# Patient Record
Sex: MALE | Race: WHITE | NOT HISPANIC OR LATINO
[De-identification: names, ages, dates, MRNs, and addresses within clinical notes are randomized per-mention and may not be internally consistent; named-entity substitution may affect disease eponyms.]

---

## 2021-05-19 PROBLEM — Z00.00 ENCOUNTER FOR PREVENTIVE HEALTH EXAMINATION: Status: ACTIVE | Noted: 2021-05-19

## 2021-05-26 ENCOUNTER — APPOINTMENT (OUTPATIENT)
Dept: NEUROLOGY | Facility: CLINIC | Age: 81
End: 2021-05-26
Payer: MEDICARE

## 2021-05-26 VITALS
DIASTOLIC BLOOD PRESSURE: 72 MMHG | HEART RATE: 62 BPM | BODY MASS INDEX: 28 KG/M2 | TEMPERATURE: 97.9 F | SYSTOLIC BLOOD PRESSURE: 143 MMHG | HEIGHT: 71 IN | OXYGEN SATURATION: 94 % | WEIGHT: 200 LBS

## 2021-05-26 DIAGNOSIS — Z86.39 PERSONAL HISTORY OF OTHER ENDOCRINE, NUTRITIONAL AND METABOLIC DISEASE: ICD-10-CM

## 2021-05-26 DIAGNOSIS — Z87.438 PERSONAL HISTORY OF OTHER DISEASES OF MALE GENITAL ORGANS: ICD-10-CM

## 2021-05-26 PROCEDURE — 99072 ADDL SUPL MATRL&STAF TM PHE: CPT

## 2021-05-26 PROCEDURE — 99204 OFFICE O/P NEW MOD 45 MIN: CPT
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
## 2021-05-26 NOTE — PHYSICAL EXAM
[FreeTextEntry1] : Alert. Fully oriented to conversation. Speech/language intact. CN intact. Motor exam intact in UE bilateral though he does have some ROM limitation due to prior shoulder injuries. RLE strength intact. LLE 4/5 with foot dorsiflexion, 4/5 foot inversion and eversion, 4/5 toe extension. Reflexes absent at bilateral ankles, brisk but symmetric at knees, and normal in UE's bilaterally. Toe down on left, equivocal on right. Sensory exam shows relatively symmetric patchy loss of temperature/pin of his bilateral LE's below the knees as well as absent vibration at toes/ankles bilaterally, normalizing at knees. FTN,  HTN intact. Gait is slightly wide based and he does have a mild steppage gait (L).

## 2021-05-26 NOTE — END OF VISIT
Appropriately full code per her wishes   [Time Spent: ___ minutes] : I have spent [unfilled] minutes of time on the encounter.

## 2021-05-26 NOTE — ASSESSMENT
[FreeTextEntry1] : The patient is a 80 year old male with peripheral neuropathy and a left foot drop.  Exam would suggest foot drop is secondary to L5 radiculopathy more than peroneal neuropathy.  Patient is interested in repeat EMG to confirm diagnosis and possibly MRI L spine pending results (nonurgent as patient has had stable symptoms for years).  He also PN on exam and we will order a few labs to evaluate for treatable causes.  The patient knows his symptoms are chronic and unlikely to reverse but is still hopeful.  He is not interested in PT and is not interested in trying gabapentin for his pain.  He will return to review results with Dr. Eubanks in the future

## 2021-06-01 LAB
ALBUMIN MFR SERPL ELPH: 61.6 %
ALBUMIN SERPL ELPH-MCNC: 4.3 G/DL
ALBUMIN SERPL-MCNC: 3.9 G/DL
ALBUMIN/GLOB SERPL: 1.6 RATIO
ALP BLD-CCNC: 83 U/L
ALPHA1 GLOB MFR SERPL ELPH: 4.1 %
ALPHA1 GLOB SERPL ELPH-MCNC: 0.3 G/DL
ALPHA2 GLOB MFR SERPL ELPH: 10.9 %
ALPHA2 GLOB SERPL ELPH-MCNC: 0.7 G/DL
ALT SERPL-CCNC: 16 U/L
ANION GAP SERPL CALC-SCNC: 10 MMOL/L
AST SERPL-CCNC: 20 U/L
B-GLOBULIN MFR SERPL ELPH: 10.5 %
B-GLOBULIN SERPL ELPH-MCNC: 0.7 G/DL
BASOPHILS # BLD AUTO: 0.04 K/UL
BASOPHILS NFR BLD AUTO: 0.7 %
BILIRUB SERPL-MCNC: 0.5 MG/DL
BUN SERPL-MCNC: 23 MG/DL
CALCIUM SERPL-MCNC: 9.7 MG/DL
CHLORIDE SERPL-SCNC: 104 MMOL/L
CO2 SERPL-SCNC: 26 MMOL/L
CREAT SERPL-MCNC: 0.71 MG/DL
CRP SERPL-MCNC: <3 MG/L
EOSINOPHIL # BLD AUTO: 0.34 K/UL
EOSINOPHIL NFR BLD AUTO: 5.5 %
ERYTHROCYTE [SEDIMENTATION RATE] IN BLOOD BY WESTERGREN METHOD: 2 MM/HR
ESTIMATED AVERAGE GLUCOSE: 120 MG/DL
FOLATE SERPL-MCNC: 11 NG/ML
GAMMA GLOB FLD ELPH-MCNC: 0.8 G/DL
GAMMA GLOB MFR SERPL ELPH: 12.9 %
GLUCOSE SERPL-MCNC: 90 MG/DL
HBA1C MFR BLD HPLC: 5.8 %
HCT VFR BLD CALC: 44.4 %
HGB BLD-MCNC: 13.8 G/DL
IMM GRANULOCYTES NFR BLD AUTO: 0.3 %
INTERPRETATION SERPL IEP-IMP: NORMAL
LYMPHOCYTES # BLD AUTO: 1.13 K/UL
LYMPHOCYTES NFR BLD AUTO: 18.4 %
MAN DIFF?: NORMAL
MCHC RBC-ENTMCNC: 31.1 GM/DL
MCHC RBC-ENTMCNC: 31.5 PG
MCV RBC AUTO: 101.4 FL
MONOCYTES # BLD AUTO: 0.74 K/UL
MONOCYTES NFR BLD AUTO: 12.1 %
NEUTROPHILS # BLD AUTO: 3.87 K/UL
NEUTROPHILS NFR BLD AUTO: 63 %
PLATELET # BLD AUTO: 224 K/UL
POTASSIUM SERPL-SCNC: 4.8 MMOL/L
PROT SERPL-MCNC: 6.4 G/DL
RBC # BLD: 4.38 M/UL
RBC # FLD: 13.3 %
SODIUM SERPL-SCNC: 140 MMOL/L
TESTOST SERPL-MCNC: 440 NG/DL
TSH SERPL-ACNC: 1.1 UIU/ML
VIT B12 SERPL-MCNC: 470 PG/ML
WBC # FLD AUTO: 6.14 K/UL

## 2021-08-03 ENCOUNTER — FORM ENCOUNTER (OUTPATIENT)
Age: 81
End: 2021-08-03

## 2021-08-03 ENCOUNTER — APPOINTMENT (OUTPATIENT)
Dept: NEUROLOGY | Facility: CLINIC | Age: 81
End: 2021-08-03
Payer: MEDICARE

## 2021-08-03 VITALS
SYSTOLIC BLOOD PRESSURE: 127 MMHG | DIASTOLIC BLOOD PRESSURE: 72 MMHG | OXYGEN SATURATION: 96 % | WEIGHT: 210 LBS | BODY MASS INDEX: 29.4 KG/M2 | HEART RATE: 70 BPM | TEMPERATURE: 98 F | HEIGHT: 71 IN

## 2021-08-03 DIAGNOSIS — M54.16 RADICULOPATHY, LUMBAR REGION: ICD-10-CM

## 2021-08-03 PROCEDURE — 95912 NRV CNDJ TEST 11-12 STUDIES: CPT

## 2021-08-03 PROCEDURE — 99214 OFFICE O/P EST MOD 30 MIN: CPT | Mod: 25

## 2021-08-03 PROCEDURE — 95886 MUSC TEST DONE W/N TEST COMP: CPT

## 2021-08-03 NOTE — PHYSICAL EXAM
[FreeTextEntry1] : Motor: ankle dorsiflexion 4+/5 L, 5/5 R; EHL 4- L. 4+ R; inversion and eversion 5/5 b/l; EDB 4 b/l; otherwise 5/5 throughout\par Sensory: vibration absent at toes and ankles; LT/PP symmetric in LE b/l\par Reflexes: 1+ UE and patellar, absent achilles b/l\par Gait: narrow base, no foot drop, Romberg negative

## 2021-08-03 NOTE — PROCEDURE
[FreeTextEntry1] : \par Nerve Conduction and Electromyography Report [FreeTextEntry3] : \par Electro Physiologic Findings:\par \par Limb temperature was monitored and maintained at approximately 30 – 34° C in the lower extremities, and 32 – 36° C in the upper extremities.\par \par The left sural and bilateral superficial fibular sensory responses were absent. The left radial sensory response was low amplitude and mildly slow. \par \par The tibial and fibular (at the EDB) motor responses were absent bilaterally. The fibular motor responses at the tibialis anterior were normal bilaterally and symmetric; there was no slowing or conduction block across the fibular head. The left median motor response was normal. \par \par Needle electromyography was performed on select left lower extremity L2-S2 appendicular muscles. There was moderate chronic denervation in the left tibialis anterior, and mild chronic denervation in the left peroneus longus, vastus lateralis, and tensor fascia latae. The left gastrocnemius was normal, and there was no active denervation in any of the muscles tested. \par \par Clinical Electrophysiological Impression: \par  \par This electrodiagnostic study demonstrated an axonal sensorimotor polyneuropathy, severely affecting the feet, and mildly affecting the hands. \par \par There was also evidence of chronic left L4 and L5 radiculopathies, without active denervation. This may be residual from prior to the patient’s lumbar spine decompression in 2015. \par \par There was no evidence of a left common fibular (peroneal) nerve entrapment at the knee.

## 2021-08-03 NOTE — ASSESSMENT
[FreeTextEntry1] : He has evidence of polyneuropathy in his feet b/l on exam and on NCS/EMG - likely idiopathic given unremarkable workup\par \par He also has chronic left L4 and L5 radiculopathies on EMG - no active denervation, indicating there is no ongoing compression, but rather seeing the result of prior injury (perhaps prior to his lumbar spine decompression in 2015)\par \par Repeat MRI L spine ordered to r/o ongoing compression of these nerves\par Continue balance exercises \par \par See separate procedure note for full results of NCS/EMG study

## 2021-08-03 NOTE — HISTORY OF PRESENT ILLNESS
[FreeTextEntry1] : Referred by Dr. Prieto for left foot weakness\par Symptoms started about 2 years ago, along with imbalance \par Foot used to "flop" when he walked, but that improved\par He also has some pain in the left leg, burning sensation, worse at night - but not always\par \par He had a lumbar spine surgery in 2015, and bilateral shoulder dislocations with axillary nerve injuries in 2012\par \par Reviewed:\par Labs - B12, folate, TSH, ESR, SPEP normal\par A1C 5.8%

## 2021-08-11 ENCOUNTER — APPOINTMENT (OUTPATIENT)
Dept: NEUROLOGY | Facility: CLINIC | Age: 81
End: 2021-08-11
Payer: MEDICARE

## 2021-08-11 VITALS
HEART RATE: 75 BPM | SYSTOLIC BLOOD PRESSURE: 135 MMHG | OXYGEN SATURATION: 96 % | BODY MASS INDEX: 29.96 KG/M2 | DIASTOLIC BLOOD PRESSURE: 80 MMHG | WEIGHT: 214 LBS | HEIGHT: 71 IN | TEMPERATURE: 98.4 F

## 2021-08-11 DIAGNOSIS — G62.9 POLYNEUROPATHY, UNSPECIFIED: ICD-10-CM

## 2021-08-11 PROCEDURE — 99215 OFFICE O/P EST HI 40 MIN: CPT

## 2021-08-11 RX ORDER — ATORVASTATIN CALCIUM 40 MG/1
40 TABLET, FILM COATED ORAL DAILY
Refills: 0 | Status: ACTIVE | COMMUNITY

## 2021-08-11 RX ORDER — TAMSULOSIN HYDROCHLORIDE 0.4 MG/1
0.4 CAPSULE ORAL
Qty: 90 | Refills: 1 | Status: ACTIVE | COMMUNITY

## 2021-09-10 ENCOUNTER — NON-APPOINTMENT (OUTPATIENT)
Age: 81
End: 2021-09-10

## 2021-09-10 DIAGNOSIS — G96.198 OTHER DISORDERS OF MENINGES, NOT ELSEWHERE CLASSIFIED: ICD-10-CM

## 2021-09-14 ENCOUNTER — NON-APPOINTMENT (OUTPATIENT)
Age: 81
End: 2021-09-14

## 2021-09-17 ENCOUNTER — NON-APPOINTMENT (OUTPATIENT)
Age: 81
End: 2021-09-17

## 2021-09-19 NOTE — ASSESSMENT
[FreeTextEntry1] : B12 is at low normal - will supplement with sublingual\par \par Lumbar radiculopathy s/p fusion - get MRI to evaluate for new or recurrent disc. CT shows ?fluid collection - will get MRI and refer to spine surgeon for second opinion.
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
## 2021-09-19 NOTE — PHYSICAL EXAM
[FreeTextEntry1] : Mental status: The patient is alert and oriented x3, naming intact x3, repetition normal, follows three-step commands, and is able to participate fully in the history taking. \par Speech is clear and fluent with good repetition, comprehension, and naming. No evidence of dysarthria.\par Memory is intact: Immediate recall 3 out of 3, short-term 3 out of 3, remote memory intact. \par \par Cranial nerves II through XII intact: \par CN II: Visual fields are full to confrontation. Pupils are 3 mm and briskly reactive to light. Visual acuity is 20/20 bilaterally.\par CN III, IV, VI: At primary gaze, there is no eye deviation. EOMI. No ptosis\par CN V: Facial sensation is intact to pinprick in all 3 divisions bilaterally. \par CN VII: Face is symmetric with normal eye closure and smile.\par CN VII: Hearing is normal to rubbing fingers\par CN IX, X: Palate elevates symmetrically. Phonation is normal.\par CN XI: Head turning and shoulder shrug are intact\par CN XII: Tongue is midline with normal movements and no atrophy.\par \par Motor exam: Upper and lower extremities 5 out of 5 power, normal muscle tone and bulk. No abnormal movements noted.\par Sensory exam: Light touch, position sense, and pinprick are intact in fingers and toes. Vibratory sense present but more diminished distally, symmetrical b/l. Sway when stands with eyes closed. \par Coordination and vestibular exam: Finger to nose intact, no evidence of truncal or appendicular ataxia. No evidence of nystagmus. ***Vestibular symptoms elicited with head turning during ambulation. Veers to left with eyes closed. \par Gait: Normal stance and gait. Posture is normal. Gait is slightly wide-based, steppage gait, unsteady with normal steps, base, arm swing, and turning. Unsteady tandem gait. Heel walking- difficulty lifting L foot, toe walking normal. Tandem gait is normal. \par Reflexes: One to 2+ in upper and lower extremities. No pathological reflexes. Downgoing toes.\par

## 2021-11-10 ENCOUNTER — APPOINTMENT (OUTPATIENT)
Dept: NEUROLOGY | Facility: CLINIC | Age: 81
End: 2021-11-10
Payer: MEDICARE

## 2021-11-10 VITALS
RESPIRATION RATE: 16 BRPM | HEIGHT: 71 IN | WEIGHT: 216 LBS | SYSTOLIC BLOOD PRESSURE: 116 MMHG | DIASTOLIC BLOOD PRESSURE: 66 MMHG | BODY MASS INDEX: 30.24 KG/M2 | HEART RATE: 72 BPM | TEMPERATURE: 97.3 F | OXYGEN SATURATION: 94 %

## 2021-11-10 DIAGNOSIS — E53.8 DEFICIENCY OF OTHER SPECIFIED B GROUP VITAMINS: ICD-10-CM

## 2021-11-10 DIAGNOSIS — Z87.39 PERSONAL HISTORY OF OTHER DISEASES OF THE MUSCULOSKELETAL SYSTEM AND CONNECTIVE TISSUE: ICD-10-CM

## 2021-11-10 PROCEDURE — 99214 OFFICE O/P EST MOD 30 MIN: CPT

## 2021-11-11 PROBLEM — E53.8 LOW VITAMIN B12 LEVEL: Status: ACTIVE | Noted: 2021-08-11

## 2021-11-11 PROBLEM — Z87.39 HISTORY OF LEFT FOOT DROP: Status: ACTIVE | Noted: 2021-05-26

## 2021-11-14 NOTE — ASSESSMENT
[FreeTextEntry1] : Agreeable to see Dr. 

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
## 2022-04-13 ENCOUNTER — APPOINTMENT (OUTPATIENT)
Dept: NEUROLOGY | Facility: CLINIC | Age: 82
End: 2022-04-13
